# Patient Record
Sex: FEMALE | Race: WHITE | NOT HISPANIC OR LATINO
[De-identification: names, ages, dates, MRNs, and addresses within clinical notes are randomized per-mention and may not be internally consistent; named-entity substitution may affect disease eponyms.]

---

## 2017-03-15 ENCOUNTER — APPOINTMENT (OUTPATIENT)
Age: 72
End: 2017-03-15

## 2017-03-15 VITALS
WEIGHT: 202 LBS | HEART RATE: 64 BPM | HEIGHT: 69 IN | TEMPERATURE: 97.2 F | DIASTOLIC BLOOD PRESSURE: 104 MMHG | RESPIRATION RATE: 14 BRPM | SYSTOLIC BLOOD PRESSURE: 175 MMHG | BODY MASS INDEX: 29.92 KG/M2

## 2017-05-16 ENCOUNTER — APPOINTMENT (OUTPATIENT)
Age: 72
End: 2017-05-16

## 2017-09-14 ENCOUNTER — APPOINTMENT (OUTPATIENT)
Dept: DERMATOLOGY | Facility: CLINIC | Age: 72
End: 2017-09-14
Payer: MEDICARE

## 2017-09-14 VITALS — SYSTOLIC BLOOD PRESSURE: 130 MMHG | DIASTOLIC BLOOD PRESSURE: 88 MMHG

## 2017-09-14 DIAGNOSIS — I78.1 NEVUS, NON-NEOPLASTIC: ICD-10-CM

## 2017-09-14 DIAGNOSIS — B07.9 VIRAL WART, UNSPECIFIED: ICD-10-CM

## 2017-09-14 DIAGNOSIS — L81.4 OTHER MELANIN HYPERPIGMENTATION: ICD-10-CM

## 2017-09-14 LAB
ALBUMIN SERPL ELPH-MCNC: 4.6 G/DL
ALP BLD-CCNC: 54 U/L
ALT SERPL-CCNC: 66 U/L
ANION GAP SERPL CALC-SCNC: 21 MMOL/L
AST SERPL-CCNC: 49 U/L
BASOPHILS # BLD AUTO: 0.06 K/UL
BASOPHILS NFR BLD AUTO: 0.9 %
BILIRUB SERPL-MCNC: 0.8 MG/DL
BUN SERPL-MCNC: 19 MG/DL
CALCIUM SERPL-MCNC: 9.8 MG/DL
CHLORIDE SERPL-SCNC: 98 MMOL/L
CO2 SERPL-SCNC: 23 MMOL/L
CREAT SERPL-MCNC: 1.04 MG/DL
EOSINOPHIL # BLD AUTO: 0.26 K/UL
EOSINOPHIL NFR BLD AUTO: 4.1 %
HCT VFR BLD CALC: 47.2 %
HGB BLD-MCNC: 15 G/DL
IMM GRANULOCYTES NFR BLD AUTO: 0.2 %
LYMPHOCYTES # BLD AUTO: 1.77 K/UL
LYMPHOCYTES NFR BLD AUTO: 28 %
MAN DIFF?: NORMAL
MCHC RBC-ENTMCNC: 27.5 PG
MCHC RBC-ENTMCNC: 31.8 GM/DL
MCV RBC AUTO: 86.4 FL
MONOCYTES # BLD AUTO: 0.49 K/UL
MONOCYTES NFR BLD AUTO: 7.8 %
NEUTROPHILS # BLD AUTO: 3.73 K/UL
NEUTROPHILS NFR BLD AUTO: 59 %
PLATELET # BLD AUTO: 251 K/UL
POTASSIUM SERPL-SCNC: 4.5 MMOL/L
PROT SERPL-MCNC: 7.5 G/DL
RBC # BLD: 5.46 M/UL
RBC # FLD: 14.2 %
SODIUM SERPL-SCNC: 142 MMOL/L
WBC # FLD AUTO: 6.32 K/UL

## 2017-09-14 PROCEDURE — 99203 OFFICE O/P NEW LOW 30 MIN: CPT | Mod: GC

## 2017-09-14 RX ORDER — CYANOCOBALAMIN (VITAMIN B-12) 500 MCG
400 LOZENGE ORAL
Qty: 60 | Refills: 3 | Status: DISCONTINUED | COMMUNITY
Start: 2017-03-15 | End: 2017-09-14

## 2017-09-20 ENCOUNTER — APPOINTMENT (OUTPATIENT)
Age: 72
End: 2017-09-20
Payer: MEDICARE

## 2017-09-20 VITALS
BODY MASS INDEX: 30.51 KG/M2 | RESPIRATION RATE: 17 BRPM | TEMPERATURE: 97.9 F | WEIGHT: 206 LBS | DIASTOLIC BLOOD PRESSURE: 80 MMHG | HEIGHT: 69 IN | HEART RATE: 58 BPM | SYSTOLIC BLOOD PRESSURE: 154 MMHG

## 2017-09-20 PROCEDURE — 99214 OFFICE O/P EST MOD 30 MIN: CPT

## 2018-03-11 ENCOUNTER — FORM ENCOUNTER (OUTPATIENT)
Age: 73
End: 2018-03-11

## 2018-03-12 ENCOUNTER — OUTPATIENT (OUTPATIENT)
Dept: OUTPATIENT SERVICES | Facility: HOSPITAL | Age: 73
LOS: 1 days | End: 2018-03-12
Payer: MEDICARE

## 2018-03-12 ENCOUNTER — APPOINTMENT (OUTPATIENT)
Dept: ULTRASOUND IMAGING | Facility: CLINIC | Age: 73
End: 2018-03-12
Payer: MEDICARE

## 2018-03-12 DIAGNOSIS — Z00.8 ENCOUNTER FOR OTHER GENERAL EXAMINATION: ICD-10-CM

## 2018-03-12 PROCEDURE — 76700 US EXAM ABDOM COMPLETE: CPT | Mod: 26

## 2018-03-12 PROCEDURE — 76700 US EXAM ABDOM COMPLETE: CPT

## 2018-03-16 LAB
ALBUMIN SERPL ELPH-MCNC: 4.7 G/DL
ALP BLD-CCNC: 62 U/L
ALT SERPL-CCNC: 57 U/L
ANION GAP SERPL CALC-SCNC: 14 MMOL/L
AST SERPL-CCNC: 38 U/L
BASOPHILS # BLD AUTO: 0.05 K/UL
BASOPHILS NFR BLD AUTO: 0.7 %
BILIRUB SERPL-MCNC: 0.4 MG/DL
BUN SERPL-MCNC: 26 MG/DL
CALCIUM SERPL-MCNC: 10.1 MG/DL
CHLORIDE SERPL-SCNC: 101 MMOL/L
CO2 SERPL-SCNC: 27 MMOL/L
CREAT SERPL-MCNC: 0.95 MG/DL
EOSINOPHIL # BLD AUTO: 0.25 K/UL
EOSINOPHIL NFR BLD AUTO: 3.5 %
HCT VFR BLD CALC: 45.5 %
HGB BLD-MCNC: 14.6 G/DL
IMM GRANULOCYTES NFR BLD AUTO: 0.1 %
LYMPHOCYTES # BLD AUTO: 1.93 K/UL
LYMPHOCYTES NFR BLD AUTO: 27.2 %
MAN DIFF?: NORMAL
MCHC RBC-ENTMCNC: 28 PG
MCHC RBC-ENTMCNC: 32.1 GM/DL
MCV RBC AUTO: 87.2 FL
MONOCYTES # BLD AUTO: 0.57 K/UL
MONOCYTES NFR BLD AUTO: 8 %
NEUTROPHILS # BLD AUTO: 4.29 K/UL
NEUTROPHILS NFR BLD AUTO: 60.5 %
PLATELET # BLD AUTO: 268 K/UL
POTASSIUM SERPL-SCNC: 4.2 MMOL/L
PROT SERPL-MCNC: 7.5 G/DL
RBC # BLD: 5.22 M/UL
RBC # FLD: 13.6 %
SODIUM SERPL-SCNC: 142 MMOL/L
WBC # FLD AUTO: 7.1 K/UL

## 2018-03-22 ENCOUNTER — APPOINTMENT (OUTPATIENT)
Age: 73
End: 2018-03-22
Payer: MEDICARE

## 2018-03-22 VITALS
WEIGHT: 207 LBS | HEIGHT: 69 IN | SYSTOLIC BLOOD PRESSURE: 177 MMHG | RESPIRATION RATE: 14 BRPM | BODY MASS INDEX: 30.66 KG/M2 | TEMPERATURE: 97.8 F | HEART RATE: 62 BPM | DIASTOLIC BLOOD PRESSURE: 96 MMHG

## 2018-03-22 PROCEDURE — 99214 OFFICE O/P EST MOD 30 MIN: CPT | Mod: 25

## 2018-03-22 PROCEDURE — 91200 LIVER ELASTOGRAPHY: CPT

## 2018-03-22 PROCEDURE — ZZZZZ: CPT

## 2018-09-05 ENCOUNTER — FORM ENCOUNTER (OUTPATIENT)
Age: 73
End: 2018-09-05

## 2018-09-06 ENCOUNTER — APPOINTMENT (OUTPATIENT)
Dept: ULTRASOUND IMAGING | Facility: CLINIC | Age: 73
End: 2018-09-06
Payer: MEDICARE

## 2018-09-06 ENCOUNTER — OUTPATIENT (OUTPATIENT)
Dept: OUTPATIENT SERVICES | Facility: HOSPITAL | Age: 73
LOS: 1 days | End: 2018-09-06
Payer: MEDICARE

## 2018-09-06 DIAGNOSIS — K76.0 FATTY (CHANGE OF) LIVER, NOT ELSEWHERE CLASSIFIED: ICD-10-CM

## 2018-09-06 DIAGNOSIS — Z00.8 ENCOUNTER FOR OTHER GENERAL EXAMINATION: ICD-10-CM

## 2018-09-06 LAB
ALBUMIN SERPL ELPH-MCNC: 4.7 G/DL
ALP BLD-CCNC: 61 U/L
ALT SERPL-CCNC: 43 U/L
ANION GAP SERPL CALC-SCNC: 17 MMOL/L
AST SERPL-CCNC: 28 U/L
BASOPHILS # BLD AUTO: 0.04 K/UL
BASOPHILS NFR BLD AUTO: 0.5 %
BILIRUB SERPL-MCNC: 0.5 MG/DL
BUN SERPL-MCNC: 21 MG/DL
CALCIUM SERPL-MCNC: 10.1 MG/DL
CHLORIDE SERPL-SCNC: 101 MMOL/L
CO2 SERPL-SCNC: 25 MMOL/L
CREAT SERPL-MCNC: 0.96 MG/DL
EOSINOPHIL # BLD AUTO: 0.21 K/UL
EOSINOPHIL NFR BLD AUTO: 2.8 %
HCT VFR BLD CALC: 49.4 %
HGB BLD-MCNC: 15.7 G/DL
IMM GRANULOCYTES NFR BLD AUTO: 0.3 %
LYMPHOCYTES # BLD AUTO: 1.94 K/UL
LYMPHOCYTES NFR BLD AUTO: 25.8 %
MAN DIFF?: NORMAL
MCHC RBC-ENTMCNC: 28.2 PG
MCHC RBC-ENTMCNC: 31.8 GM/DL
MCV RBC AUTO: 88.7 FL
MONOCYTES # BLD AUTO: 0.61 K/UL
MONOCYTES NFR BLD AUTO: 8.1 %
NEUTROPHILS # BLD AUTO: 4.7 K/UL
NEUTROPHILS NFR BLD AUTO: 62.5 %
PLATELET # BLD AUTO: 293 K/UL
POTASSIUM SERPL-SCNC: 4.5 MMOL/L
PROT SERPL-MCNC: 7.1 G/DL
RBC # BLD: 5.57 M/UL
RBC # FLD: 13.9 %
SODIUM SERPL-SCNC: 143 MMOL/L
WBC # FLD AUTO: 7.52 K/UL

## 2018-09-06 PROCEDURE — 76700 US EXAM ABDOM COMPLETE: CPT | Mod: 26

## 2018-09-06 PROCEDURE — 76700 US EXAM ABDOM COMPLETE: CPT

## 2018-09-13 ENCOUNTER — APPOINTMENT (OUTPATIENT)
Dept: HEPATOLOGY | Facility: CLINIC | Age: 73
End: 2018-09-13
Payer: MEDICARE

## 2018-09-13 VITALS
WEIGHT: 202 LBS | RESPIRATION RATE: 14 BRPM | DIASTOLIC BLOOD PRESSURE: 96 MMHG | BODY MASS INDEX: 29.92 KG/M2 | SYSTOLIC BLOOD PRESSURE: 174 MMHG | HEIGHT: 69 IN | TEMPERATURE: 97.9 F | HEART RATE: 63 BPM

## 2018-09-13 PROCEDURE — 99214 OFFICE O/P EST MOD 30 MIN: CPT

## 2018-09-13 RX ORDER — CYANOCOBALAMIN (VITAMIN B-12) 500 MCG
400 LOZENGE ORAL
Qty: 60 | Refills: 3 | Status: ACTIVE | COMMUNITY
Start: 2017-09-20

## 2019-03-08 LAB
ALBUMIN SERPL ELPH-MCNC: 4.7 G/DL
ALP BLD-CCNC: 67 U/L
ALT SERPL-CCNC: 77 U/L
ANION GAP SERPL CALC-SCNC: 14 MMOL/L
AST SERPL-CCNC: 48 U/L
BILIRUB SERPL-MCNC: 0.6 MG/DL
BUN SERPL-MCNC: 16 MG/DL
CALCIUM SERPL-MCNC: 10.2 MG/DL
CHLORIDE SERPL-SCNC: 100 MMOL/L
CO2 SERPL-SCNC: 27 MMOL/L
CREAT SERPL-MCNC: 0.89 MG/DL
POTASSIUM SERPL-SCNC: 4.1 MMOL/L
PROT SERPL-MCNC: 7 G/DL
SODIUM SERPL-SCNC: 141 MMOL/L

## 2019-03-14 ENCOUNTER — APPOINTMENT (OUTPATIENT)
Dept: HEPATOLOGY | Facility: CLINIC | Age: 74
End: 2019-03-14
Payer: MEDICARE

## 2019-03-14 VITALS
BODY MASS INDEX: 30.07 KG/M2 | TEMPERATURE: 97.2 F | DIASTOLIC BLOOD PRESSURE: 100 MMHG | WEIGHT: 203 LBS | SYSTOLIC BLOOD PRESSURE: 170 MMHG | HEART RATE: 66 BPM | RESPIRATION RATE: 14 BRPM | HEIGHT: 69 IN

## 2019-03-14 PROCEDURE — 99214 OFFICE O/P EST MOD 30 MIN: CPT

## 2019-03-24 ENCOUNTER — FORM ENCOUNTER (OUTPATIENT)
Age: 74
End: 2019-03-24

## 2019-03-25 ENCOUNTER — APPOINTMENT (OUTPATIENT)
Dept: ULTRASOUND IMAGING | Facility: CLINIC | Age: 74
End: 2019-03-25
Payer: MEDICARE

## 2019-03-25 ENCOUNTER — OUTPATIENT (OUTPATIENT)
Dept: OUTPATIENT SERVICES | Facility: HOSPITAL | Age: 74
LOS: 1 days | End: 2019-03-25
Payer: MEDICARE

## 2019-03-25 DIAGNOSIS — Z00.8 ENCOUNTER FOR OTHER GENERAL EXAMINATION: ICD-10-CM

## 2019-03-25 PROCEDURE — 76700 US EXAM ABDOM COMPLETE: CPT

## 2019-03-25 PROCEDURE — 76700 US EXAM ABDOM COMPLETE: CPT | Mod: 26

## 2019-03-29 ENCOUNTER — APPOINTMENT (OUTPATIENT)
Dept: HEPATOLOGY | Facility: CLINIC | Age: 74
End: 2019-03-29
Payer: MEDICARE

## 2019-03-29 PROCEDURE — 91200 LIVER ELASTOGRAPHY: CPT

## 2019-09-18 LAB
BASOPHILS # BLD AUTO: 0.08 K/UL
BASOPHILS NFR BLD AUTO: 1.1 %
EOSINOPHIL # BLD AUTO: 0.2 K/UL
EOSINOPHIL NFR BLD AUTO: 2.8 %
HCT VFR BLD CALC: 48.7 %
HGB BLD-MCNC: 15.1 G/DL
IMM GRANULOCYTES NFR BLD AUTO: 0.1 %
LYMPHOCYTES # BLD AUTO: 1.9 K/UL
LYMPHOCYTES NFR BLD AUTO: 26.5 %
MAN DIFF?: NORMAL
MCHC RBC-ENTMCNC: 27.7 PG
MCHC RBC-ENTMCNC: 31 GM/DL
MCV RBC AUTO: 89.2 FL
MONOCYTES # BLD AUTO: 0.67 K/UL
MONOCYTES NFR BLD AUTO: 9.4 %
NEUTROPHILS # BLD AUTO: 4.3 K/UL
NEUTROPHILS NFR BLD AUTO: 60.1 %
PLATELET # BLD AUTO: 274 K/UL
RBC # BLD: 5.46 M/UL
RBC # FLD: 13.2 %
WBC # FLD AUTO: 7.16 K/UL

## 2019-09-19 LAB
ALBUMIN SERPL ELPH-MCNC: 4.9 G/DL
ALP BLD-CCNC: 63 U/L
ALT SERPL-CCNC: 79 U/L
ANION GAP SERPL CALC-SCNC: 16 MMOL/L
AST SERPL-CCNC: 51 U/L
BILIRUB SERPL-MCNC: 0.7 MG/DL
BUN SERPL-MCNC: 22 MG/DL
CALCIUM SERPL-MCNC: 10 MG/DL
CHLORIDE SERPL-SCNC: 102 MMOL/L
CO2 SERPL-SCNC: 23 MMOL/L
CREAT SERPL-MCNC: 0.95 MG/DL
POTASSIUM SERPL-SCNC: 4.4 MMOL/L
PROT SERPL-MCNC: 7.2 G/DL
SODIUM SERPL-SCNC: 141 MMOL/L

## 2019-09-26 ENCOUNTER — APPOINTMENT (OUTPATIENT)
Dept: HEPATOLOGY | Facility: CLINIC | Age: 74
End: 2019-09-26

## 2019-09-27 ENCOUNTER — APPOINTMENT (OUTPATIENT)
Dept: HEPATOLOGY | Facility: CLINIC | Age: 74
End: 2019-09-27
Payer: MEDICARE

## 2019-09-27 VITALS
RESPIRATION RATE: 14 BRPM | SYSTOLIC BLOOD PRESSURE: 152 MMHG | HEART RATE: 67 BPM | DIASTOLIC BLOOD PRESSURE: 95 MMHG | BODY MASS INDEX: 30.57 KG/M2 | TEMPERATURE: 98.1 F | WEIGHT: 207 LBS

## 2019-09-27 PROCEDURE — 99214 OFFICE O/P EST MOD 30 MIN: CPT

## 2019-09-27 NOTE — HISTORY OF PRESENT ILLNESS
[___ Month(s) Ago] : [unfilled] month(s) ago [de-identified] : Marissa comes in today for routine followup. She is being followed for NAFLD  and is taking vitamin E 800  IU a day. She has not been watching her diet and has gained 4 pounds since March. She walks about 2 miles twice a week. \par \par She had a Cologuard test done in her primary care physician's office October 22, 2018 which was negative\par \par Fibroscan test from 3/29/19 showed F2, S3\par \par Fibroscan March 22, 2018 with F3 (kPa 10), S3 disease\par \par A fibroscan performed May 16, 2017 showed F1-F2, S2\par \par A fibroscan performed on March 14, 2016 showed F0 disease.\par \par A fibroscan performed on August 27, 2014 showed F2 disease.\par \par Blood tests 9/18/19 ALT 79, AST 51, ALP 63, Tbil 0.7\par \par Blood tests from 7/12/19 , AST 92, Tbil 0.8, HGB 15.4, PLTs 270,000, A1c 6.1, Triglycerides 233, , chol 196\par \par Blood tests March 7, 2019 ALT 77, AST 48, alkaline phosphatase 67\par \par Blood tests September 5, 2018 ALT 43, AST 28, total bilirubin 0.5, alkaline phosphatase 61, platelet count 293,000. Abdominal sonogram revealed mild hepatomegaly with fatty infiltration\par \par \par Blood test March 15, 2018 platelet count 268,000, ALT 57, abdominal sonogram March 12, 2018 with fatty infiltration of the liver and hepatomegaly with liver measuring 21 cm\par \par Blood tests September 13, 2017 ALT 66, AST 49, total bilirubin 0.8, creatinine 1.04, hemoglobin 15\par \par Blood tests March 9, 2017 ALT 51 AST 35 alkaline phosphatase 57 total bilirubin 0.6\par \par Blood tests September 8, 2016 alpha-fetoprotein 2.6 hemoglobin 14.5 platelet count 282,000 INR 1.12 ALT 46 AST 34 creatinine 1\par \par Blood tests March 10, 2016 ALT 15, AST 38, alkaline phosphatase 51, total bilirubin 0.8. An abdominal sonogram March 11, 2016 reveals no lesions in the liver\par \par Blood tests August 13, 2015 ALT 46, AST 32, creatinine 1.02\par \par Blood tests from February 12 2015 showing normal CBC, ALT 33, AST 23\par \par blood tests from August 22, 2014 ALT 59, AST 36.\par \par Blood tests from February 20, 2014 ALT of 46, AST 32 \par \par An abdominal sonogram in November 2012 revealed diffuse hepatic steatosis. At that time, her ferritin was 818 and her fasting insulin was elevated. Her ACE level was 70.\par \par Blood tests from June 2013 show an  AST 99. On August 6, 2013  and AST 53.

## 2019-09-27 NOTE — ASSESSMENT
[FreeTextEntry1] : Nonalcoholic fatty liver disease with recent fibroscan test showing F2, S3. Her liver enzymes are elevated but decreased from July 2019. She is currently taking Vitamin E 800 IU daily and will continue.  \par \par Her BMI is 31 and she is a pre-diabetic with elevated lipids. I recommended discussing with her cardiologist about starting statins. Discussed that she needs to lose 5-10% of her body weight. We discussed at length foods to avoid and some modifications to her diet. I would like pt to increased walking to 5 days a week for 30-40 mins a day. \par \par I spoke with the patient at length regarding fatty liver disease. I have reviewed the spectrum of disease as well as the risk of disease progression. I have explained that patient with fatty liver disease may progress to the development of cirrhosis and its complications. I have also explained the patient with fatty liver disease may develop liver cancer without cirrhosis and therefore should be screen yearly with an abdominal ultrasound. I have explained that fatty liver disease is commonly seen in patients with diabetes or those who are overweight. I have explained that fatty liver disease may also be a precursor to the development of diabetes as it is part of the metabolic syndrome. I have reviewed the treatment of fatty liver disease with the patient. I have explained that the best therapy is diet and exercise. I have reviewed and appropriate diet with the patient. I have recommended the avoidance of fatty foods and to follow a good healthy heart diet. I have explained that weight loss may lead to an improvement in the underlying liver disease state. \par \par I will see her back in 6 months with repeat laboratory tests and abdominal sonogram.

## 2019-09-27 NOTE — PHYSICAL EXAM
[Scleral Icterus] : No Scleral Icterus [Jaundice] : No jaundice [Sclera] : the sclera and conjunctiva were normal [General Appearance - Alert] : alert [Neck Appearance] : the appearance of the neck was normal [Neck Cervical Mass (___cm)] : no neck mass was observed [Jugular Venous Distention Increased] : there was no jugular-venous distention [Thyroid Diffuse Enlargement] : the thyroid was not enlarged [Thyroid Nodule] : there were no palpable thyroid nodules [] : no respiratory distress [Heart Rate And Rhythm] : heart rate was normal and rhythm regular [Heart Sounds] : normal S1 and S2 [Heart Sounds Gallop] : no gallops [Murmurs] : no murmurs [Heart Sounds Pericardial Friction Rub] : no pericardial rub [Edema] : there was no peripheral edema [Abdomen Soft] : soft [Abdomen Tenderness] : non-tender [No Focal Deficits] : no focal deficits [Oriented To Time, Place, And Person] : oriented to person, place, and time [Affect] : the affect was normal

## 2019-09-27 NOTE — CONSULT LETTER
[Courtesy Letter:] : I had the pleasure of seeing your patient, [unfilled], in my office today. [Dear  ___] : Dear  [unfilled], [Please see my note below.] : Please see my note below. [Consult Closing:] : Thank you very much for allowing me to participate in the care of this patient.  If you have any questions, please do not hesitate to contact me. [Sincerely,] : Sincerely, [Charmaine Brown, N.P] : Charmaine Brown, N.P

## 2020-07-15 ENCOUNTER — APPOINTMENT (OUTPATIENT)
Dept: HEPATOLOGY | Facility: CLINIC | Age: 75
End: 2020-07-15

## 2020-07-20 ENCOUNTER — LABORATORY RESULT (OUTPATIENT)
Age: 75
End: 2020-07-20

## 2020-07-21 ENCOUNTER — OUTPATIENT (OUTPATIENT)
Dept: OUTPATIENT SERVICES | Facility: HOSPITAL | Age: 75
LOS: 1 days | End: 2020-07-21
Payer: MEDICARE

## 2020-07-21 ENCOUNTER — APPOINTMENT (OUTPATIENT)
Dept: ULTRASOUND IMAGING | Facility: CLINIC | Age: 75
End: 2020-07-21
Payer: MEDICARE

## 2020-07-21 DIAGNOSIS — K76.0 FATTY (CHANGE OF) LIVER, NOT ELSEWHERE CLASSIFIED: ICD-10-CM

## 2020-07-21 LAB
AFP-TM SERPL-MCNC: 3.2 NG/ML
INR PPP: 1.34 RATIO
PT BLD: 15.6 SEC

## 2020-07-21 PROCEDURE — 76700 US EXAM ABDOM COMPLETE: CPT | Mod: 26

## 2020-07-21 PROCEDURE — 76700 US EXAM ABDOM COMPLETE: CPT

## 2020-07-31 ENCOUNTER — APPOINTMENT (OUTPATIENT)
Dept: HEPATOLOGY | Facility: CLINIC | Age: 75
End: 2020-07-31
Payer: MEDICARE

## 2020-07-31 VITALS
HEART RATE: 80 BPM | SYSTOLIC BLOOD PRESSURE: 189 MMHG | BODY MASS INDEX: 30.66 KG/M2 | HEIGHT: 69 IN | WEIGHT: 207 LBS | TEMPERATURE: 97.4 F | DIASTOLIC BLOOD PRESSURE: 100 MMHG

## 2020-07-31 PROCEDURE — 99214 OFFICE O/P EST MOD 30 MIN: CPT

## 2020-07-31 NOTE — REVIEW OF SYSTEMS
[As Noted in HPI] : as noted in HPI [Chest Pain] : no chest pain [Palpitations] : no palpitations [Leg Claudication] : no intermittent leg claudication [Lower Ext Edema] : no lower extremity edema [Joint Pain] : no joint pain [Negative] : Musculoskeletal

## 2020-07-31 NOTE — HISTORY OF PRESENT ILLNESS
[___ Month(s) Ago] : [unfilled] month(s) ago [de-identified] : Ms. MERLY GARCIA is 74 year old female who presents for the follow up appointment with H/O NAFLD  and is taking vitamin E 800  IU a day. She has not been watching her diet and weighs same since 2019. She used to walk about 2 miles twice a week. Been sedentary during COVID crisis. Her BP was high as she has not taken any HTN Meds and will check with her PCP after checking it at home. Denies any complaints of Chest pain, palpitations, and SOB or any abdominal complaints. She had a pacemaker placed and on Eliquis since then for anticoagulation evident by the elevated PT, INR. Also had a Appendectomy on 26 Dec 2019. \par She is hard of hearing and had difficult communication with the masks covering our faces, written back the concerns, questions and plan of care with her.\par \par Fibroscan test from 3/29/19 showed F2, S3\par Fibroscan March 22, 2018 with F3 (kPa 10), S3 disease\par A fibroscan performed May 16, 2017 showed F1-F2, S2\par A fibroscan performed on March 14, 2016 showed F0 disease.\par A fibroscan performed on August 27, 2014 showed F2 disease.\par \par Labs done on Jul 21, 2020  AST 44 , ALT 53 , ALP 60 U/L , PT/INR 15.6/1.34, AFP 3.2, .\par Blood tests 9/18/19 ALT 79, AST 51, ALP 63, Tbil 0.7\par Blood tests from 7/12/19 , AST 92, Tbil 0.8, HGB 15.4, PLTs 270,000, A1c 6.1, Triglycerides 233, , chol 196\par Blood tests March 7, 2019 ALT 77, AST 48, alkaline phosphatase 67\par Blood tests September 5, 2018 ALT 43, AST 28, total bilirubin 0.5, alkaline phosphatase 61, platelet count 293,000. \par Blood test March 15, 2018 platelet count 268,000, ALT 57, \par Blood tests September 13, 2017 ALT 66, AST 49, total bilirubin 0.8, creatinine 1.04, hemoglobin 15\par Blood tests March 9, 2017 ALT 51 AST 35 alkaline phosphatase 57 total bilirubin 0.6\par Blood tests September 8, 2016 alpha-fetoprotein 2.6 hemoglobin 14.5 platelet count 282,000 INR 1.12 ALT 46 AST 34 creatinine 1\par Blood tests March 10, 2016 ALT 15, AST 38, alkaline phosphatase 51, total bilirubin 0.8. \par Blood tests August 13, 2015 ALT 46, AST 32, creatinine 1.02\par Blood tests from February 12 2015 showing normal CBC, ALT 33, AST 23\par Blood tests from August 22, 2014 ALT 59, AST 36.\par Blood tests from February 20, 2014 ALT of 46, AST 32 \par Blood tests from June 2013 show an  AST 99. On August 6, 2013  and AST 53.\par Nov 2012 her ferritin was 818 and her fasting insulin was elevated. Her ACE level was 70.\par \par Abdominal Ultrasound results with Fatty liver and mild Hepatomegaly, No focal lesions, No Ascites. \par US Sep 2018 revealed mild hepatomegaly with fatty infiltration\par Abdominal sonogram March 12, 2018 with fatty infiltration of the liver and hepatomegaly with liver measuring 21 cm\par An abdominal sonogram March 11, 2016 reveals no lesions in the liver\par An abdominal sonogram in November 2012 revealed diffuse hepatic steatosis. \par

## 2020-07-31 NOTE — PHYSICAL EXAM
[General Appearance - Alert] : alert [Sclera] : the sclera and conjunctiva were normal [Neck Appearance] : the appearance of the neck was normal [Neck Cervical Mass (___cm)] : no neck mass was observed [Jugular Venous Distention Increased] : there was no jugular-venous distention [Thyroid Diffuse Enlargement] : the thyroid was not enlarged [Thyroid Nodule] : there were no palpable thyroid nodules [Heart Rate And Rhythm] : heart rate was normal and rhythm regular [Heart Sounds] : normal S1 and S2 [Heart Sounds Gallop] : no gallops [Murmurs] : no murmurs [Heart Sounds Pericardial Friction Rub] : no pericardial rub [Edema] : there was no peripheral edema [Abdomen Soft] : soft [Abdomen Tenderness] : non-tender [No Focal Deficits] : no focal deficits [Oriented To Time, Place, And Person] : oriented to person, place, and time [Affect] : the affect was normal [Scleral Icterus] : No Scleral Icterus [Abdominal  Ascites] : no ascites [Non-Tender] : non-tender [Smooth] : smooth [Jaundice] : No jaundice [Abnormal Walk] : normal gait [Nail Clubbing] : no clubbing  or cyanosis of the fingernails [Skin Color & Pigmentation] : normal skin color and pigmentation [] : no rash

## 2020-07-31 NOTE — ASSESSMENT
[FreeTextEntry1] : Ms. MERLY GARCIA is 74 year old female who presents for the follow up appointment with H/O NAFLD  and is taking vitamin E 800  IU a day. Nonalcoholic fatty liver disease with 2019 Fibroscan test showing F2, S3. Her liver enzymes are elevated but decreased from 2019. She is currently taking Vitamin E 800 IU daily and will continue. She had Appendectomy and Pacemaker placed since 2019 and working well with no complications. She has High BP in office and will take the meds now and will recheck the BP at home and will Call PCP if still elevated. She said she was anxious about [place] and with mask can’t read lips which makes her anxious. \par \par Her BMI is 31 and she is a pre-diabetic with elevated lipids. I recommended discussing with her cardiologist about starting statins. Discussed that she needs to lose 5-10% of her body weight. We discussed at length foods to avoid and some modifications to her diet. I would like pt. to increased walking to 5 days a week for 30-40 mins a day. \par \par PLAN to check on her after reaching home for her High BP. Advised to call back with BP.\par To continue the current treatment for maintenance.\par F/U in 6 months with labs and Ab US. Will get Fibro Scan and Hep A and B Immunizations started with next appointment.\par \par I spoke with the patient at length regarding fatty liver disease. I have reviewed the spectrum of disease as well as the risk of disease progression. I have explained that patient with fatty liver disease may progress to the development of cirrhosis and its complications. I have also explained the patient with fatty liver disease may develop liver cancer without cirrhosis and therefore should be screen yearly with an abdominal ultrasound. I have explained that fatty liver disease is commonly seen in patients with diabetes or those who are overweight. I have explained that fatty liver disease may also be a precursor to the development of diabetes as it is part of the metabolic syndrome. I have reviewed the treatment of fatty liver disease with the patient. I have explained that the best therapy is diet and exercise. I have reviewed and appropriate diet with the patient. I have recommended the avoidance of fatty foods and to follow a good healthy heart diet. I have explained that weight loss may lead to an improvement in the underlying liver disease state. \par

## 2021-01-28 LAB
AFP-TM SERPL-MCNC: 4 NG/ML
ALBUMIN SERPL ELPH-MCNC: 4.6 G/DL
ALP BLD-CCNC: 70 U/L
ALT SERPL-CCNC: 101 U/L
ANION GAP SERPL CALC-SCNC: 16 MMOL/L
AST SERPL-CCNC: 71 U/L
BASOPHILS # BLD AUTO: 0.09 K/UL
BASOPHILS NFR BLD AUTO: 1.1 %
BILIRUB SERPL-MCNC: 0.6 MG/DL
BUN SERPL-MCNC: 17 MG/DL
CALCIUM SERPL-MCNC: 9.7 MG/DL
CHLORIDE SERPL-SCNC: 102 MMOL/L
CO2 SERPL-SCNC: 23 MMOL/L
CREAT SERPL-MCNC: 0.95 MG/DL
EOSINOPHIL # BLD AUTO: 0.21 K/UL
EOSINOPHIL NFR BLD AUTO: 2.6 %
HCT VFR BLD CALC: 47.7 %
HGB BLD-MCNC: 14.8 G/DL
IMM GRANULOCYTES NFR BLD AUTO: 0.2 %
INR PPP: 1.34 RATIO
LYMPHOCYTES # BLD AUTO: 1.94 K/UL
LYMPHOCYTES NFR BLD AUTO: 23.8 %
MAN DIFF?: NORMAL
MCHC RBC-ENTMCNC: 27.3 PG
MCHC RBC-ENTMCNC: 31 GM/DL
MCV RBC AUTO: 88 FL
MONOCYTES # BLD AUTO: 0.63 K/UL
MONOCYTES NFR BLD AUTO: 7.7 %
NEUTROPHILS # BLD AUTO: 5.25 K/UL
NEUTROPHILS NFR BLD AUTO: 64.6 %
PLATELET # BLD AUTO: 249 K/UL
POTASSIUM SERPL-SCNC: 3.9 MMOL/L
PROT SERPL-MCNC: 7 G/DL
PT BLD: 15.7 SEC
RBC # BLD: 5.42 M/UL
RBC # FLD: 13.4 %
SODIUM SERPL-SCNC: 141 MMOL/L
WBC # FLD AUTO: 8.14 K/UL

## 2021-01-29 ENCOUNTER — APPOINTMENT (OUTPATIENT)
Dept: ULTRASOUND IMAGING | Facility: CLINIC | Age: 76
End: 2021-01-29
Payer: MEDICARE

## 2021-01-29 ENCOUNTER — OUTPATIENT (OUTPATIENT)
Dept: OUTPATIENT SERVICES | Facility: HOSPITAL | Age: 76
LOS: 1 days | End: 2021-01-29
Payer: MEDICARE

## 2021-01-29 ENCOUNTER — RESULT REVIEW (OUTPATIENT)
Age: 76
End: 2021-01-29

## 2021-01-29 DIAGNOSIS — K76.0 FATTY (CHANGE OF) LIVER, NOT ELSEWHERE CLASSIFIED: ICD-10-CM

## 2021-01-29 PROCEDURE — 76700 US EXAM ABDOM COMPLETE: CPT

## 2021-01-29 PROCEDURE — 76700 US EXAM ABDOM COMPLETE: CPT | Mod: 26

## 2021-02-12 ENCOUNTER — APPOINTMENT (OUTPATIENT)
Dept: HEPATOLOGY | Facility: CLINIC | Age: 76
End: 2021-02-12
Payer: MEDICARE

## 2021-02-12 VITALS
SYSTOLIC BLOOD PRESSURE: 170 MMHG | TEMPERATURE: 97.9 F | WEIGHT: 209 LBS | HEIGHT: 69 IN | RESPIRATION RATE: 15 BRPM | DIASTOLIC BLOOD PRESSURE: 84 MMHG | BODY MASS INDEX: 30.96 KG/M2 | HEART RATE: 83 BPM

## 2021-02-12 DIAGNOSIS — Z95.0 PRESENCE OF CARDIAC PACEMAKER: ICD-10-CM

## 2021-02-12 PROCEDURE — 91200 LIVER ELASTOGRAPHY: CPT

## 2021-02-12 PROCEDURE — 99214 OFFICE O/P EST MOD 30 MIN: CPT

## 2021-02-12 NOTE — ASSESSMENT
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 I have discussed with her at length regarding this.  Because of this, I have recommended that she be screened every 6 months with blood tests and abdominal sonogram for hepatocellular carcinoma\par \par I spoke with the patient at length regarding fatty liver disease. I have reviewed the spectrum of disease as well as the risk of disease progression. I have explained that fatty liver disease may progress to the development of cirrhosis and its complications. I have also explained that patients with fatty liver disease may develop liver cancer without cirrhosis and therefore should be screened yearly with an abdominal ultrasound. I have explained that fatty liver disease is commonly seen in patients with diabetes or those who are overweight. I have explained that fatty liver disease may also be a precursor to the development of diabetes as it is part of the metabolic syndrome. I have reviewed the treatment of fatty liver disease. I have explained that the best therapy is diet and exercise. I have reviewed an appropriate diet with the patient. I have recommended the avoidance of fatty foods and to follow a good healthy heart diet. I have explained that weight loss may lead to an improvement in the underlying liver disease state. I have recommended the avoidance of alcohol.  I have recommended weight loss and told her that she should try to lose between 7 and 10% of her body weight\par \par \par I will see her back in 6 months with repeat laboratory tests and abdominal sonogram

## 2021-02-12 NOTE — REVIEW OF SYSTEMS
[Recent Weight Gain (___ Lbs)] : recent [unfilled] ~Ulb weight gain [Negative] : Gastrointestinal [Fever] : no fever [Chills] : no chills

## 2021-02-12 NOTE — HISTORY OF PRESENT ILLNESS
[___ Month(s) Ago] : [unfilled] month(s) ago [de-identified] : Marissa comes in today for followup. She has no complaints at this time. She has been taking vitamin E 800 mg a day.  She reports that she has had significant dietary indiscretion due to Covid.  She also states that she has not been exercising due to numerous body pains and isolation secondary to Covid.\par \par She had a Cologuard test done in her primary care physician's office October 22, 2018 which was negative\par \par FibroScan February 12, 2021 F3, S3\par \par Fibroscan March 22, 2018 with F3 (kPa 10), S3 disease\par \par A fibroscan performed May 16, 2017 showed F1-F2, S2\par \par A fibroscan performed on March 14, 2016 showed F0 disease.\par \par A fibroscan performed on August 27, 2014 showed F2 disease.\par \par Blood test January 27, 2021 platelet count 249,000, , AST 71, total bilirubin 0.6, alkaline phosphatase 70, INR 1.34, alpha-fetoprotein 4.  Abdominal sonogram January 29, 2021 reveals a fatty liver without lesions\par \par Blood tests March 7, 2019 ALT 77, AST 48, alkaline phosphatase 67\par \par Blood tests September 5, 2018 ALT 43, AST 28, total bilirubin 0.5, alkaline phosphatase 61, platelet count 293,000. Abdominal sonogram revealed mild hepatomegaly with fatty infiltration\par \par \par Blood test March 15, 2018 platelet count 268,000, ALT 57, abdominal sonogram March 12, 2018 with fatty infiltration of the liver and hepatomegaly with liver measuring 21 cm\par \par Blood tests September 13, 2017 ALT 66, AST 49, total bilirubin 0.8, creatinine 1.04, hemoglobin 15\par \par Blood tests March 9, 2017 ALT 51 AST 35 alkaline phosphatase 57 total bilirubin 0.6\par \par Blood tests September 8, 2016 alpha-fetoprotein 2.6 hemoglobin 14.5 platelet count 282,000 INR 1.12 ALT 46 AST 34 creatinine 1\par \par Blood tests March 10, 2016 ALT 15, AST 38, alkaline phosphatase 51, total bilirubin 0.8. An abdominal sonogram March 11, 2016 reveals no lesions in the liver\par \par Blood tests August 13, 2015 ALT 46, AST 32, creatinine 1.02\par \par Blood tests from February 12 2015 showing normal CBC, ALT 33, AST 23\par \par blood tests from August 22, 2014 ALT 59, AST 36.\par \par Blood tests from February 20, 2014 ALT of 46, AST 32 \par \par An abdominal sonogram in November 2012 revealed diffuse hepatic steatosis. At that time, her ferritin was 818 and her fasting insulin was elevated. Her ACE level was 70.\par \par Blood tests from June 2013 show an  AST 99. On August 6, 2013  and AST 53.

## 2021-02-12 NOTE — PHYSICAL EXAM
[General Appearance - Alert] : alert [Sclera] : the sclera and conjunctiva were normal [PERRL With Normal Accommodation] : pupils were equal in size, round, and reactive to light [Extraocular Movements] : extraocular movements were intact [Neck Appearance] : the appearance of the neck was normal [Neck Cervical Mass (___cm)] : no neck mass was observed [Jugular Venous Distention Increased] : there was no jugular-venous distention [Thyroid Diffuse Enlargement] : the thyroid was not enlarged [Thyroid Nodule] : there were no palpable thyroid nodules [Heart Rate And Rhythm] : heart rate was normal and rhythm regular [Heart Sounds] : normal S1 and S2 [Heart Sounds Gallop] : no gallops [Murmurs] : no murmurs [Heart Sounds Pericardial Friction Rub] : no pericardial rub [Edema] : there was no peripheral edema [Abdomen Soft] : soft [Abdomen Tenderness] : non-tender [No CVA Tenderness] : no ~M costovertebral angle tenderness [No Spinal Tenderness] : no spinal tenderness [Abnormal Walk] : normal gait [Nail Clubbing] : no clubbing  or cyanosis of the fingernails [Musculoskeletal - Swelling] : no joint swelling seen [Motor Tone] : muscle strength and tone were normal [Skin Color & Pigmentation] : normal skin color and pigmentation [Skin Turgor] : normal skin turgor [] : no rash [Deep Tendon Reflexes (DTR)] : deep tendon reflexes were 2+ and symmetric [Sensation] : the sensory exam was normal to light touch and pinprick [No Focal Deficits] : no focal deficits [Oriented To Time, Place, And Person] : oriented to person, place, and time [Impaired Insight] : insight and judgment were intact [Affect] : the affect was normal [Scleral Icterus] : No Scleral Icterus [Jaundice] : No jaundice

## 2021-02-12 NOTE — CONSULT LETTER
[Dear  ___] : Dear  [unfilled], [Courtesy Letter:] : I had the pleasure of seeing your patient, [unfilled], in my office today. [Please see my note below.] : Please see my note below. [Consult Closing:] : Thank you very much for allowing me to participate in the care of this patient.  If you have any questions, please do not hesitate to contact me. [Sincerely,] : Sincerely, [Michael Sherwood MD, FACP, ARIANNA, ROSALIE, SHANNON] : Michael Sherwood MD, FACP, ARIANNA, ROSALIE, SHANNON [Chief, Division of Hepatology] : Chief, Division of Hepatology [[facility]] : [facility] [Professor of Medicine] : Professor of Medicine

## 2021-07-26 ENCOUNTER — APPOINTMENT (OUTPATIENT)
Dept: ULTRASOUND IMAGING | Facility: CLINIC | Age: 76
End: 2021-07-26
Payer: MEDICARE

## 2021-07-26 ENCOUNTER — OUTPATIENT (OUTPATIENT)
Dept: OUTPATIENT SERVICES | Facility: HOSPITAL | Age: 76
LOS: 1 days | End: 2021-07-26
Payer: MEDICARE

## 2021-07-26 DIAGNOSIS — Z00.8 ENCOUNTER FOR OTHER GENERAL EXAMINATION: ICD-10-CM

## 2021-07-26 PROCEDURE — 76700 US EXAM ABDOM COMPLETE: CPT

## 2021-07-26 PROCEDURE — 76700 US EXAM ABDOM COMPLETE: CPT | Mod: 26

## 2021-07-27 LAB
AFP-TM SERPL-MCNC: 3.7 NG/ML
ALBUMIN SERPL ELPH-MCNC: 4.6 G/DL
ALP BLD-CCNC: 69 U/L
ALT SERPL-CCNC: 82 U/L
ANION GAP SERPL CALC-SCNC: 16 MMOL/L
AST SERPL-CCNC: 53 U/L
BASOPHILS # BLD AUTO: 0.05 K/UL
BASOPHILS NFR BLD AUTO: 0.7 %
BILIRUB SERPL-MCNC: 0.8 MG/DL
BUN SERPL-MCNC: 20 MG/DL
CALCIUM SERPL-MCNC: 10.1 MG/DL
CHLORIDE SERPL-SCNC: 102 MMOL/L
CO2 SERPL-SCNC: 23 MMOL/L
CREAT SERPL-MCNC: 0.89 MG/DL
EOSINOPHIL # BLD AUTO: 0.25 K/UL
EOSINOPHIL NFR BLD AUTO: 3.4 %
HCT VFR BLD CALC: 48.5 %
HGB BLD-MCNC: 15.4 G/DL
IMM GRANULOCYTES NFR BLD AUTO: 0.3 %
INR PPP: 1.46 RATIO
LYMPHOCYTES # BLD AUTO: 1.53 K/UL
LYMPHOCYTES NFR BLD AUTO: 20.5 %
MAN DIFF?: NORMAL
MCHC RBC-ENTMCNC: 27.6 PG
MCHC RBC-ENTMCNC: 31.8 GM/DL
MCV RBC AUTO: 87.1 FL
MONOCYTES # BLD AUTO: 0.68 K/UL
MONOCYTES NFR BLD AUTO: 9.1 %
NEUTROPHILS # BLD AUTO: 4.93 K/UL
NEUTROPHILS NFR BLD AUTO: 66 %
PLATELET # BLD AUTO: 237 K/UL
POTASSIUM SERPL-SCNC: 4.1 MMOL/L
PROT SERPL-MCNC: 6.7 G/DL
PT BLD: 16.9 SEC
RBC # BLD: 5.57 M/UL
RBC # FLD: 13.7 %
SODIUM SERPL-SCNC: 141 MMOL/L
WBC # FLD AUTO: 7.46 K/UL

## 2021-08-05 ENCOUNTER — APPOINTMENT (OUTPATIENT)
Dept: HEPATOLOGY | Facility: CLINIC | Age: 76
End: 2021-08-05
Payer: MEDICARE

## 2021-08-05 VITALS
TEMPERATURE: 98 F | HEIGHT: 69 IN | BODY MASS INDEX: 31.25 KG/M2 | HEART RATE: 63 BPM | RESPIRATION RATE: 15 BRPM | DIASTOLIC BLOOD PRESSURE: 71 MMHG | WEIGHT: 211 LBS | SYSTOLIC BLOOD PRESSURE: 183 MMHG

## 2021-08-05 PROCEDURE — 99214 OFFICE O/P EST MOD 30 MIN: CPT

## 2022-04-06 ENCOUNTER — OUTPATIENT (OUTPATIENT)
Dept: OUTPATIENT SERVICES | Facility: HOSPITAL | Age: 77
LOS: 1 days | End: 2022-04-06
Payer: MEDICARE

## 2022-04-06 ENCOUNTER — APPOINTMENT (OUTPATIENT)
Dept: ULTRASOUND IMAGING | Facility: CLINIC | Age: 77
End: 2022-04-06
Payer: MEDICARE

## 2022-04-06 DIAGNOSIS — K74.02 HEPATIC FIBROSIS, ADVANCED FIBROSIS: ICD-10-CM

## 2022-04-06 PROCEDURE — 76700 US EXAM ABDOM COMPLETE: CPT

## 2022-04-06 PROCEDURE — 76700 US EXAM ABDOM COMPLETE: CPT | Mod: 26

## 2022-04-07 LAB
BASOPHILS # BLD AUTO: 0.06 K/UL
BASOPHILS NFR BLD AUTO: 0.9 %
EOSINOPHIL # BLD AUTO: 0.23 K/UL
EOSINOPHIL NFR BLD AUTO: 3.5 %
HCT VFR BLD CALC: 50.1 %
HGB BLD-MCNC: 15.5 G/DL
IMM GRANULOCYTES NFR BLD AUTO: 0.2 %
LYMPHOCYTES # BLD AUTO: 1.57 K/UL
LYMPHOCYTES NFR BLD AUTO: 24.1 %
MAN DIFF?: NORMAL
MCHC RBC-ENTMCNC: 27.6 PG
MCHC RBC-ENTMCNC: 30.9 GM/DL
MCV RBC AUTO: 89.1 FL
MONOCYTES # BLD AUTO: 0.62 K/UL
MONOCYTES NFR BLD AUTO: 9.5 %
NEUTROPHILS # BLD AUTO: 4.03 K/UL
NEUTROPHILS NFR BLD AUTO: 61.8 %
PLATELET # BLD AUTO: 213 K/UL
RBC # BLD: 5.62 M/UL
RBC # FLD: 13.3 %
WBC # FLD AUTO: 6.52 K/UL

## 2022-04-08 LAB
AFP-TM SERPL-MCNC: 5.2 NG/ML
ALBUMIN SERPL ELPH-MCNC: 4.6 G/DL
ALP BLD-CCNC: 95 U/L
ALT SERPL-CCNC: 87 U/L
ANION GAP SERPL CALC-SCNC: 18 MMOL/L
AST SERPL-CCNC: 79 U/L
BILIRUB SERPL-MCNC: 0.8 MG/DL
BUN SERPL-MCNC: 17 MG/DL
CALCIUM SERPL-MCNC: 9.6 MG/DL
CHLORIDE SERPL-SCNC: 102 MMOL/L
CO2 SERPL-SCNC: 22 MMOL/L
CREAT SERPL-MCNC: 0.89 MG/DL
EGFR: 67 ML/MIN/1.73M2
INR PPP: 1.39 RATIO
POTASSIUM SERPL-SCNC: 4.2 MMOL/L
PROT SERPL-MCNC: 7.1 G/DL
PT BLD: 16.2 SEC
SODIUM SERPL-SCNC: 142 MMOL/L

## 2022-04-13 ENCOUNTER — APPOINTMENT (OUTPATIENT)
Dept: HEPATOLOGY | Facility: CLINIC | Age: 77
End: 2022-04-13
Payer: MEDICARE

## 2022-04-13 VITALS
BODY MASS INDEX: 31.7 KG/M2 | HEART RATE: 97 BPM | HEIGHT: 69 IN | OXYGEN SATURATION: 96 % | WEIGHT: 214 LBS | DIASTOLIC BLOOD PRESSURE: 90 MMHG | RESPIRATION RATE: 12 BRPM | SYSTOLIC BLOOD PRESSURE: 145 MMHG

## 2022-04-13 DIAGNOSIS — K76.0 FATTY (CHANGE OF) LIVER, NOT ELSEWHERE CLASSIFIED: ICD-10-CM

## 2022-04-13 PROCEDURE — 99213 OFFICE O/P EST LOW 20 MIN: CPT

## 2022-04-13 RX ORDER — APIXABAN 5 MG/1
5 TABLET, FILM COATED ORAL
Qty: 60 | Refills: 0 | Status: ACTIVE | COMMUNITY
Start: 2022-04-13

## 2022-04-13 RX ORDER — ROSUVASTATIN CALCIUM 5 MG/1
5 TABLET, FILM COATED ORAL
Qty: 30 | Refills: 2 | Status: ACTIVE | COMMUNITY
Start: 2022-04-13

## 2022-04-13 RX ORDER — VALSARTAN AND HYDROCHLOROTHIAZIDE 160; 12.5 MG/1; MG/1
160-12.5 TABLET, FILM COATED ORAL
Refills: 0 | Status: ACTIVE | COMMUNITY
Start: 2022-04-13

## 2022-04-13 RX ORDER — METOPROLOL SUCCINATE 25 MG/1
25 TABLET, EXTENDED RELEASE ORAL
Refills: 0 | Status: ACTIVE | COMMUNITY
Start: 2022-04-13

## 2022-04-19 ENCOUNTER — APPOINTMENT (OUTPATIENT)
Dept: OTOLARYNGOLOGY | Facility: CLINIC | Age: 77
End: 2022-04-19
Payer: MEDICARE

## 2022-04-19 VITALS
BODY MASS INDEX: 31.7 KG/M2 | HEART RATE: 61 BPM | HEIGHT: 69 IN | DIASTOLIC BLOOD PRESSURE: 93 MMHG | SYSTOLIC BLOOD PRESSURE: 128 MMHG | WEIGHT: 214 LBS | TEMPERATURE: 97.3 F

## 2022-04-19 DIAGNOSIS — H90.3 SENSORINEURAL HEARING LOSS, BILATERAL: ICD-10-CM

## 2022-04-19 DIAGNOSIS — K11.20 SIALOADENITIS, UNSPECIFIED: ICD-10-CM

## 2022-04-19 DIAGNOSIS — R60.9 EDEMA, UNSPECIFIED: ICD-10-CM

## 2022-04-19 PROCEDURE — 99204 OFFICE O/P NEW MOD 45 MIN: CPT

## 2022-04-21 PROBLEM — H90.3 BILATERAL SENSORINEURAL HEARING LOSS: Status: ACTIVE | Noted: 2022-04-21

## 2022-04-21 NOTE — CONSULT LETTER
[Please see my note below.] : Please see my note below. [FreeTextEntry1] : Dear Dr. SELVIN STAPLETON \par I had the pleasure of evaluating your patient MERLY GARCIA, thank you for allowing us to participate in their care. please see full note detailing our visit below.\par If you have any questions, please do not hesitate to call me and I would be happy to discuss further. \par \par Khoa Reddy M.D.\par Attending Physician,  \par Department of Otolaryngology - Head and Neck Surgery\par [facility] \par Office: [phone]\par Fax: [phone]\par \par

## 2022-04-21 NOTE — ASSESSMENT
[FreeTextEntry1] : 75 y/o F with  left parotiditis and right parotid swelling, on exam with left murky flow and with TMJ inflammation \par pt only with one episode, advised will monitor and do conservative management \par - Will proceed with regiment to increase salivary flow to reduce pooling in the gland and washout any possible obstruction if possible. Recommended hydration, regular massage, sour candies, and warm compress\par - Patient likely has inflammation of the temporomandibular joint as a cause of their discomfit. I discussed with them the pathophysiology of TMJ disorders and we reviewed treatment options. At this point we will begin with a regiment to decrease inflammation, local muscle spasms and demands on the joint to allow for recovery. We also discussed exercises and stretches to preform to decrease pain and relapse. Reviewed possible further interventions including muscle relaxants and injections. They will let me know if it does not completely resolve.\par \par \par pt also with b/l hearing loss benign ear exam \par Discussed with patient options for treatment of hearing loss. Discussed how it is functionally limiting their ADL's and social enjoyment and engagement. At this junction they would like to consider hearing appliance to aid in hearing and improve function. Placed a referral to audiology for a hearing aid evaluation and education on options.\par

## 2022-04-21 NOTE — PHYSICAL EXAM
[Normal] : mucosa is normal [Midline] : trachea located in midline position [de-identified] : left clear flow, right slightly murky

## 2022-04-21 NOTE — HISTORY OF PRESENT ILLNESS
[de-identified] : 77 y/o F with hx of Afib on eliquis c/o left parotiditis x two months ago, in the past has had swelling on the right side x years ago. Now with parotid gland swelling that happened for the first time. pt states was chewing on that side, will feel a surge of something and then swelling starts to appear, lasts all day, will ice it and will go down eventually. No infections, has not had abx for it. \par + dry eyes\par no imaging done \par pt denies autoimmune d/o, dry mouth \par \par

## 2022-04-21 NOTE — END OF VISIT
[FreeTextEntry3] : I personally saw and examined MERLY GARCIA in detail. I spoke to MELISSA Somers regarding the assessment and plan of care.  I preformed the procedures and I reviewed the above assessment and plan of care, and agree. I have made changes in changes in the body of the note where appropriate.\par \par

## 2022-06-08 DIAGNOSIS — K74.60 UNSPECIFIED CIRRHOSIS OF LIVER: ICD-10-CM

## 2022-06-08 DIAGNOSIS — K74.02 HEPATIC FIBROSIS, ADVANCED FIBROSIS: ICD-10-CM

## 2022-10-05 ENCOUNTER — APPOINTMENT (OUTPATIENT)
Dept: HEPATOLOGY | Facility: CLINIC | Age: 77
End: 2022-10-05

## 2024-03-01 ENCOUNTER — OUTPATIENT (OUTPATIENT)
Dept: OUTPATIENT SERVICES | Facility: HOSPITAL | Age: 79
LOS: 1 days | End: 2024-03-01
Payer: MEDICARE

## 2024-03-01 ENCOUNTER — APPOINTMENT (OUTPATIENT)
Dept: ULTRASOUND IMAGING | Facility: CLINIC | Age: 79
End: 2024-03-01
Payer: MEDICARE

## 2024-03-01 DIAGNOSIS — D75.1 SECONDARY POLYCYTHEMIA: ICD-10-CM

## 2024-03-01 PROCEDURE — 76705 ECHO EXAM OF ABDOMEN: CPT

## 2024-03-01 PROCEDURE — 76705 ECHO EXAM OF ABDOMEN: CPT | Mod: 26

## 2024-03-03 ENCOUNTER — TRANSCRIPTION ENCOUNTER (OUTPATIENT)
Age: 79
End: 2024-03-03